# Patient Record
Sex: FEMALE | Race: BLACK OR AFRICAN AMERICAN | Employment: FULL TIME | URBAN - METROPOLITAN AREA
[De-identification: names, ages, dates, MRNs, and addresses within clinical notes are randomized per-mention and may not be internally consistent; named-entity substitution may affect disease eponyms.]

---

## 2017-05-16 ENCOUNTER — HOSPITAL ENCOUNTER (EMERGENCY)
Age: 22
Discharge: HOME | End: 2017-05-16
Attending: EMERGENCY MEDICINE
Payer: COMMERCIAL

## 2017-05-16 VITALS
OXYGEN SATURATION: 100 % | WEIGHT: 145 LBS | TEMPERATURE: 98.3 F | RESPIRATION RATE: 15 BRPM | SYSTOLIC BLOOD PRESSURE: 139 MMHG | HEART RATE: 96 BPM | DIASTOLIC BLOOD PRESSURE: 82 MMHG
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
## 2017-05-16 DIAGNOSIS — S16.1XXA CERVICAL STRAIN, INITIAL ENCOUNTER: ICD-10-CM

## 2017-05-16 PROCEDURE — 99282 EMERGENCY DEPT VISIT SF MDM: CPT

## 2017-05-16 RX ORDER — CYCLOBENZAPRINE HCL 5 MG
10 TABLET ORAL 3 TIMES DAILY
Qty: 9 TAB | Refills: 0 | Status: SHIPPED | OUTPATIENT
Start: 2017-05-16

## 2017-05-16 NOTE — LETTER
NOTIFICATION RETURN TO WORK / SCHOOL 
 
5/16/2017 9:39 AM 
 
Ms. Denisse Page [facility] [id number] To Whom It May Concern: 
 
Vicki Auther Peabody is currently under the care of [facility] EMERGENCY DEPT. She will return to work/school on: 05/17/2017 If there are questions or concerns please have the patient contact our office.  
 
 
 
Sincerely, 
 
 
Cortez Cain RN

## 2017-05-16 NOTE — ED NOTES
Discharge instructions and prescription provided to patient and verbalized understanding. Pt ambulated to [facility] with a steady gait.
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
